# Patient Record
Sex: FEMALE | Race: WHITE | NOT HISPANIC OR LATINO | Employment: PART TIME | URBAN - METROPOLITAN AREA
[De-identification: names, ages, dates, MRNs, and addresses within clinical notes are randomized per-mention and may not be internally consistent; named-entity substitution may affect disease eponyms.]

---

## 2017-07-10 ENCOUNTER — TELEPHONE (OUTPATIENT)
Dept: HEALTH INFORMATION MANAGEMENT | Facility: OTHER | Age: 77
End: 2017-07-10

## 2017-07-10 ENCOUNTER — PATIENT OUTREACH (OUTPATIENT)
Dept: HEALTH INFORMATION MANAGEMENT | Facility: OTHER | Age: 77
End: 2017-07-10

## 2017-07-10 NOTE — TELEPHONE ENCOUNTER
Patient is over the recommended age and is showing overdue for Pap Smear, Mammogram, Colonoscopy and Bone Density in Health Maintenance.    Please reply to this message as to whether these tests are appropriate and I will update the Health Maintenance Topic or contact the patient to schedule.

## 2017-08-07 NOTE — PROGRESS NOTES
Attempt #:4    WebIZ Checked & Epic Updated: yes  HealthConnect Verified: yes  Verify PCP: yes    Communication Preference Obtained: yes     Review Care Team: yes    Annual Wellness Visit Scheduling  1. Scheduling Status:Scheduled          Care Gap Scheduling (Attempt to Schedule EACH Overdue Care Gap!)     Health Maintenance Due   Topic Date Due   • Annual Wellness Visit  SCHEDULED   • IMM DTaP/Tdap/Td Vaccine (1 - Tdap) WILL DISCUSS WITH PCP   • IMM ZOSTER VACCINE  WILL DISCUSS WITH PCP   • IMM PNEUMOCOCCAL 65+ (ADULT) LOW/MEDIUM RISK SERIES (1 of 2 - PCV13) WILL DISCUSS WITH PCP         MyChart Activation: sent activation code  C3 Metrics Mike: no  Virtual Visits: no  Opt In to Text Messages: no

## 2017-11-06 ENCOUNTER — TELEPHONE (OUTPATIENT)
Dept: MEDICAL GROUP | Facility: LAB | Age: 77
End: 2017-11-06

## 2017-11-06 NOTE — TELEPHONE ENCOUNTER
ANNUAL WELLNESS VISIT PRE-VISIT PLANNING     1.  Reviewed note from last office visit with PCP: YES    2.  If any orders were placed at last visit, do we have Results/Consult Notes?        •  Labs - Labs ordered, completed on 12/08/2016 and results are in chart.   Note: If patient appointment is for lab review and patient did not complete labs, check with provider if OK to reschedule patient until labs completed.       •  Imaging - Imaging was not ordered at last office visit.       •  Referrals - No referrals were ordered at last office visit.    3.  Immunizations were updated in [facility] using WebIZ?: Yes       •  WebIZ Recommendations: FLU, PREVNAR (PCV13) , TDAP and ZOSTAVAX (Shingles)       •  Is patient due for Tdap? YES. Patient was notified of copay/out of pocket cost.       •  Is patient due for Shingles? YES. Patient was notified of copay/out of pocket cost.     4.  Patient is due for the following Health Maintenance Topics:   Health Maintenance Due   Topic Date Due   • Annual Wellness Visit  1940   • IMM DTaP/Tdap/Td Vaccine (1 - Tdap) 07/22/1960   • IMM ZOSTER VACCINE  07/22/2001   • IMM PNEUMOCOCCAL 65+ (ADULT) LOW/MEDIUM RISK SERIES (1 of 2 - PCV13) 07/22/2006   • IMM INFLUENZA (1) 09/01/2017           5.  Reviewed/Updated the following with patient:       •   Preferred Pharmacy? YES       •   Preferred Lab? YES       •   Preferred Communication? YES       •   Allergies? YES       •   Medications? YES. Was Abstract Encounter opened and chart updated? YES       •   Social History? YES. Was Abstract Encounter opened and chart updated? YES       •   Family History (document living status of immediate family members and if + hx of cancer, diabetes, hypertension, hyperlipidemia, heart attack, stroke) YES. Was Abstract Encounter opened and chart updated? YES    6.  Care Team Updated:       •   DME Company (gait device, O2, CPAP, etc.): N\A       •   Other Specialists (eye doctor, derm, GYN, cardiology,  endo, etc): N\A    7.  Patient has the following Care Path diagnoses on Problem List:  NONE    8.  Specialty Comments was updated with diagnosis information provided by SCP: NO    9.  Patient was advised: “This is a free wellness visit. The provider will screen for medical conditions to help you stay healthy. If you have other concerns to address you may be asked to discuss these at a separate visit or there may be an additional fee.”     6.  Patient was informed to arrive 15 min prior to their scheduled appointment and bring in their medication bottles.

## 2017-11-10 ENCOUNTER — OFFICE VISIT (OUTPATIENT)
Dept: MEDICAL GROUP | Facility: LAB | Age: 77
End: 2017-11-10
Payer: MEDICARE

## 2017-11-10 VITALS
WEIGHT: 137 LBS | BODY MASS INDEX: 25.86 KG/M2 | HEIGHT: 61 IN | HEART RATE: 65 BPM | SYSTOLIC BLOOD PRESSURE: 106 MMHG | TEMPERATURE: 97.9 F | OXYGEN SATURATION: 98 % | DIASTOLIC BLOOD PRESSURE: 68 MMHG

## 2017-11-10 DIAGNOSIS — E78.5 DYSLIPIDEMIA: ICD-10-CM

## 2017-11-10 DIAGNOSIS — Z00.00 PREVENTATIVE HEALTH CARE: Primary | ICD-10-CM

## 2017-11-10 DIAGNOSIS — Z12.11 COLON CANCER SCREENING: ICD-10-CM

## 2017-11-10 DIAGNOSIS — R31.29 MICROHEMATURIA: ICD-10-CM

## 2017-11-10 PROCEDURE — G0439 PPPS, SUBSEQ VISIT: HCPCS | Performed by: PHYSICIAN ASSISTANT

## 2017-11-10 ASSESSMENT — PATIENT HEALTH QUESTIONNAIRE - PHQ9
SUM OF ALL RESPONSES TO PHQ QUESTIONS 1-9: 0
CLINICAL INTERPRETATION OF PHQ2 SCORE: 0

## 2017-11-10 NOTE — ASSESSMENT & PLAN NOTE
Not taking any medications.   Diet: Stated is very healthy, lots of vegetables. Does eat some meat but mostly chicken  Exercise: 3 days weekly mostly walking, plus she is very active and walks a lot for her job  No tobacco use  Etoh: About once every 6 months  Denies chest pain, shortness of breath, lightheadedness, muscle cramping   Ref. Range 12/8/2016 07:20   Cholesterol,Tot Latest Ref Range: 100 - 199 mg/dL 230 (H)   Triglycerides Latest Ref Range: 0 - 149 mg/dL 170 (H)   HDL Latest Ref Range: >=40 mg/dL 54   LDL Latest Ref Range: <100 mg/dL 142 (H)

## 2017-11-11 NOTE — PROGRESS NOTES
Chief Complaint   Patient presents with   • Annual Wellness Visit         HPI:  Ngozi is a 77 y.o. here for Medicare Annual Wellness Visit    Dyslipidemia  Not taking any medications.   Diet: Stated is very healthy, lots of vegetables. Does eat some meat but mostly chicken  Exercise: 3 days weekly mostly walking, plus she is very active and walks a lot for her job  No tobacco use  Etoh: About once every 6 months  Denies chest pain, shortness of breath, lightheadedness, muscle cramping   Ref. Range 12/8/2016 07:20   Cholesterol,Tot Latest Ref Range: 100 - 199 mg/dL 230 (H)   Triglycerides Latest Ref Range: 0 - 149 mg/dL 170 (H)   HDL Latest Ref Range: >=40 mg/dL 54   LDL Latest Ref Range: <100 mg/dL 142 (H)       Microhematuria  11/8/16 she had mod blood on UA, culture + Klebsiella pneumoniae which was treated appropriately but never had f/u UA.   Denies gross hematuria, abdominal discomfort, flank pain, dysuria, frequency of urination, urgency of urination, hesitancy of urination, fever or chills      Patient Active Problem List    Diagnosis Date Noted   • Dyslipidemia 11/10/2017   • Microhematuria 11/10/2017   • Preventative health care 12/06/2016       No current outpatient prescriptions on file.     No current facility-administered medications for this visit.         Not taking medications  Current supplements as per medication list.     Allergies: Aspirin; Codeine; and Declomycin [demeclocycline hcl]    Current social contact/activities: shopping, reading, decorating, dancing     Is patient current with immunizations? No, due for FLU, PREVNAR (PCV13) , TDAP and ZOSTAVAX (Shingles). Patient is interested in receiving NONE today.    She  reports that she quit smoking about 13 months ago. Her smoking use included Cigarettes. She has a 3.75 pack-year smoking history. She has never used smokeless tobacco. She reports that she drinks alcohol. She reports that she does not use drugs.  Counseling given:  Yes        DPA/Advanced directive: Patient does not have an Advanced Directive.  A packet and workshop information was given on Advanced Directives.    ROS:    Gait: Uses no assistive device   Ostomy: no   Other tubes: no   Amputations: no   Chronic oxygen use no   Last eye exam seven or eight years ago   Wears hearing aids: no   : Denies incontinence.     Depression Screening    Little interest or pleasure in doing things?  0 - not at all  Feeling down, depressed, or hopeless? 0 - not at all  Patient Health Questionnaire Score: 0    If depressive symptoms identified deferred to follow up visit unless specifically addressed in assessment and plan.    Interpretation of PHQ-9 Total Score   Score Severity   1-4 No Depression   5-9 Mild Depression   10-14 Moderate Depression   15-19 Moderately Severe Depression   20-27 Severe Depression    Screening for Cognitive Impairment    Three Minute Recall (apple, watch, pricila)  2/3 Apple,pricila, table  Draw clock face with all 12 numbers set to the hand to show 10 minutes past 11 o'clock   5/5  If cognitive concerns identified, deferred for follow up unless specifically addressed in assessment and plan.    Fall Risk Assessment    Has the patient had two or more falls in the last year or any fall with injury in the last year?  No  If fall risk identified, deferred for follow up unless specifically addressed in assessment and plan.    Safety Assessment    Throw rugs on floor.  Yes  Handrails on all stairs.  Yes  Good lighting in all hallways.  Yes  Difficulty hearing.  No  Patient counseled about all safety risks that were identified.    Functional Assessment ADLs    Are there any barriers preventing you from cooking for yourself or meeting nutritional needs?  No.    Are there any barriers preventing you from driving safely or obtaining transportation?  No.    Are there any barriers preventing you from using a telephone or calling for help?  No.    Are there any barriers  "preventing you from shopping?  No.    Are there any barriers preventing you from taking care of your own finances?  No.    Are there any barriers preventing you from managing your medications?  No. Not taking any medications.  Are you currently engaging any exercise or physical activity?  Yes.  Walking every day.    Health Maintenance Summary                Annual Wellness Visit Overdue 1940     IMM DTaP/Tdap/Td Vaccine Overdue 7/22/1960     IMM ZOSTER VACCINE Overdue 7/22/2001     IMM PNEUMOCOCCAL 65+ (ADULT) LOW/MEDIUM RISK SERIES Overdue 7/22/2006     IMM INFLUENZA Overdue 9/1/2017           Patient Care Team:  Marcie Damon P.A.-C. as PCP - General (Family Medicine)    Social History   Substance Use Topics   • Smoking status: Former Smoker     Packs/day: 0.25     Years: 15.00     Types: Cigarettes     Quit date: 10/1/2016   • Smokeless tobacco: Never Used      Comment: every other day for around 25 years.    • Alcohol use Yes      Comment: wine with italin dinner. 1 time every 6 mo.      Family History   Problem Relation Age of Onset   • Cancer Sister      breast, half sister   • Heart Disease Neg Hx    • Diabetes Neg Hx    • Stroke Neg Hx      She  has a past medical history of Personal history of venous thrombosis and embolism. She also has no past medical history of Breast cancer (CMS-HCC).   Past Surgical History:   Procedure Laterality Date   • COLPOSACROPEXY ROBOTIC  2/1/2012    Performed by KIP TORO at [facility]   • CYSTOSCOPY  2/1/2012    Performed by KIP TORO at [facility]   • ABDOMINAL HYSTERECTOMY TOTAL  age 30    simple hysterectomy           Exam:     Blood pressure 106/68, pulse 65, temperature 36.6 °C (97.9 °F), height 1.549 m (5' 1\"), weight 62.1 kg (137 lb), SpO2 98 %. Body mass index is 25.89 kg/m².    Hearing excellent.    Dentition good  Alert, oriented in no acute distress.  Eye contact is good, speech goal directed, affect calm  HEENT: " conjunctiva non-injected, sclera non-icteric, PERRL.  Pinna normal. TM pearly gray.   Oral mucous membranes pink and moist with no lesions.  Neck No adenopathy or masses in the neck or supraclavicular regions.  Lungs: clear to auscultation bilaterally with good excursion.  CV: regular rate and rhythm.  Abdomen: soft, nontender, no HSM, No CVAT  Ext: no edema, color normal, peripheral pulses 2+, temperature normal    Assessment and Plan. The following treatment and monitoring plan is recommended:    1. Preventative health care     mammo- 10/24/13- normal, denies any further  Pap- hysterectomy age 29yo.   Colonoscopy- never. Declines  FIT-ordered today  Flu- declines   Shingles- declines  Pneumonia- declines.  Annual Wellness Visit - Includes PPPS Subsequent ()   2. Dyslipidemia  COMP METABOLIC PANEL   Uncontrolled, labs ordered today, will call with results   Discussed in depth dietary changes can be made for healthy lifestyle  LIPID PROFILE    Annual Wellness Visit - Includes PPPS Subsequent ()   3. Microhematuria   Had an infection, most likely is just from the infection though we are following up  Annual Wellness Visit - Includes PPPS Subsequent ()    URINALYSIS,CULTURE IF INDICATED    CANCELED: URINALYSIS,CULTURE IF INDICATED   4. Colon cancer screening  OCCULT BLOOD FECES IMMUNOASSAY   Declined colonoscopy   Ordered  Fit test today Annual Wellness Visit - Includes PPPS Subsequent ()         Services suggested: No services needed at this time  Health Care Screening recommendations as per orders if indicated.  Referrals offered: PT/OT/Nutrition counseling/Behavioral Health/Smoking cessation as per orders if indicated.    Discussion today about general wellness and lifestyle habits:    · Prevent falls and reduce trip hazards; Cautioned about securing or removing rugs.  · Have a working fire alarm and carbon monoxide detector;   · Engage in regular physical activity and social activities        Follow-up: Return in about 6 months (around 5/10/2018) for chronic conditions.

## 2017-12-13 ENCOUNTER — HOSPITAL ENCOUNTER (OUTPATIENT)
Facility: MEDICAL CENTER | Age: 77
End: 2017-12-13
Attending: PHYSICIAN ASSISTANT
Payer: MEDICARE

## 2017-12-13 PROCEDURE — 82274 ASSAY TEST FOR BLOOD FECAL: CPT

## 2017-12-17 DIAGNOSIS — Z12.11 COLON CANCER SCREENING: ICD-10-CM

## 2017-12-18 LAB — HEMOCCULT STL QL IA: NEGATIVE

## 2017-12-19 ENCOUNTER — TELEPHONE (OUTPATIENT)
Dept: MEDICAL GROUP | Facility: LAB | Age: 77
End: 2017-12-19

## 2017-12-19 NOTE — TELEPHONE ENCOUNTER
----- Message from Marcie Damon P.A.-C. sent at 12/18/2017  5:37 PM PST -----  Please inform patient that her FIT test was negative for blood in her stool  Recommend repeat in one year  Thank you  Marcie

## 2018-04-27 ENCOUNTER — PATIENT OUTREACH (OUTPATIENT)
Dept: HEALTH INFORMATION MANAGEMENT | Facility: OTHER | Age: 78
End: 2018-04-27

## 2018-04-27 NOTE — PROGRESS NOTES
Outcome: Left Message    Please transfer to Patient Outreach Team at [phone] when patient returns call.        Attempt # 1

## 2018-05-10 ENCOUNTER — APPOINTMENT (OUTPATIENT)
Dept: MEDICAL GROUP | Facility: LAB | Age: 78
End: 2018-05-10
Payer: MEDICARE

## 2018-07-09 ENCOUNTER — APPOINTMENT (OUTPATIENT)
Dept: MEDICAL GROUP | Facility: MEDICAL CENTER | Age: 78
End: 2018-07-09
Payer: MEDICARE

## 2018-08-23 ENCOUNTER — OFFICE VISIT (OUTPATIENT)
Dept: MEDICAL GROUP | Facility: MEDICAL CENTER | Age: 78
End: 2018-08-23
Payer: MEDICARE

## 2018-08-23 VITALS
HEART RATE: 58 BPM | BODY MASS INDEX: 25.06 KG/M2 | TEMPERATURE: 97.9 F | HEIGHT: 61 IN | SYSTOLIC BLOOD PRESSURE: 130 MMHG | OXYGEN SATURATION: 99 % | WEIGHT: 132.72 LBS | DIASTOLIC BLOOD PRESSURE: 68 MMHG

## 2018-08-23 DIAGNOSIS — Z00.00 MEDICARE ANNUAL WELLNESS VISIT, SUBSEQUENT: ICD-10-CM

## 2018-08-23 DIAGNOSIS — E78.5 DYSLIPIDEMIA: ICD-10-CM

## 2018-08-23 DIAGNOSIS — Z00.00 PREVENTATIVE HEALTH CARE: ICD-10-CM

## 2018-08-23 PROBLEM — R31.29 MICROHEMATURIA: Status: RESOLVED | Noted: 2017-11-10 | Resolved: 2018-08-23

## 2018-08-23 PROCEDURE — G0439 PPPS, SUBSEQ VISIT: HCPCS | Performed by: INTERNAL MEDICINE

## 2018-08-23 ASSESSMENT — ENCOUNTER SYMPTOMS: GENERAL WELL-BEING: EXCELLENT

## 2018-08-23 ASSESSMENT — PATIENT HEALTH QUESTIONNAIRE - PHQ9: CLINICAL INTERPRETATION OF PHQ2 SCORE: 0

## 2018-08-23 ASSESSMENT — ACTIVITIES OF DAILY LIVING (ADL): BATHING_REQUIRES_ASSISTANCE: 0

## 2018-08-24 NOTE — PROGRESS NOTES
CC:   had concerns including Establish Care and Other (question about prolapse). and Health Risk Assessment Exam    HPI:  Ngozi is a 78 y.o. here for Health Risk Assessment and Establish Care and Other (question about prolapse)    PCP: Darrel Ballard M.D.  Other Care Team Members: PCP  Patient Care Team:  Darrel Ballard M.D. as PCP - General (Family Medicine)    Patient Active Problem List    Diagnosis Date Noted   • Dyslipidemia 11/10/2017   • Preventative health care 12/06/2016     No current outpatient prescriptions on file.     No current facility-administered medications for this visit.       Current supplements: as above.  Chronic narcotic pain medicines: no  Allergies: Aspirin; Codeine; and Declomycin [demeclocycline hcl]    Screening:  Depression Screening  Little interest or pleasure in doing things?  0 - not at all  Feeling down, depressed , or hopeless? 0 - not at all  Patient Health Questionnaire Score: 0     If depressive symptoms identified deferred to follow up visit unless specifically addressed in assessment and plan.    Interpretation of PHQ-9 Total Score   Score Severity   1-4 No Depression   5-9 Mild Depression   10-14 Moderate Depression   15-19 Moderately Severe Depression   20-27 Severe Depression    Screening for Cognitive Impairment    Three Minute Recall (leader, season, table) 3/3    Demetrius clock face with all 12 numbers and set the hands to show 10 past 11.  Yes    Cognitive concerns identified deferred for follow up unless specifically addressed in assessment and plan.    Fall Risk Assessment    Has the patient had two or more falls in the last year or any fall with injury in the last year?  No    Safety Assessment    Throw rugs on floor.  Yes  Handrails on all stairs.  Yes  Good lighting in all hallways.  Yes  Difficulty hearing.  Yes  Patient counseled about all safety risks that were identified.    Functional Assessment ADLs    Are there any barriers preventing you from  cooking for yourself or meeting nutritional needs?  No.    Are there any barriers preventing you from driving safely or obtaining transportation?  No.    Are there any barriers preventing you from using a telephone or calling for help?  No.    Are there any barriers preventing you from shopping?  No.    Are there any barriers preventing you from taking care of your own finances?  No.    Are there any barriers preventing you from managing your medications?  No.    Are there any barriers preventing you from showering, bathing or dressing yourself?  No.    Are you currently engaging in any exercise or physical activity?  Yes.     What is your perception of your health?  Excellent.      Health Maintenance Summary                IMM DTaP/Tdap/Td Vaccine Overdue 7/22/1960     IMM ZOSTER VACCINES Overdue 7/22/1991     IMM PNEUMOCOCCAL 65+ (ADULT) LOW/MEDIUM RISK SERIES Overdue 7/22/2006     IMM INFLUENZA Next Due 9/1/2018     Annual Wellness Visit Next Due 8/24/2019      Done 8/23/2018           Patient Care Team:  Darrel Ballard M.D. as PCP - General (Family Medicine)      Allergies: Aspirin; Codeine; and Declomycin [demeclocycline hcl]  No current Epic-ordered outpatient prescriptions on file.     No current [facility]-ordered facility-administered medications on file.      Past Medical History:   Diagnosis Date   • Personal history of venous thrombosis and embolism     at age 26, from oral contaceptives     Past Surgical History:   Procedure Laterality Date   • COLPOSACROPEXY ROBOTIC  2/1/2012    Performed by KPI TORO at SUR[facility]ERY [facility]   • CYSTOSCOPY  2/1/2012    Performed by KIP TORO at SUR[facility]   • ABDOMINAL HYSTERECTOMY TOTAL  age 30    simple hysterectomy     Social History   Substance Use Topics   • Smoking status: Former Smoker     Packs/day: 0.25     Years: 15.00     Types: Cigarettes     Quit date: 10/1/2016   • Smokeless tobacco: Never Used      Comment: every other  "day for around 25 years.    • Alcohol use Yes      Comment: wine with italin dinner. 1 time every 6 mo.      Family History   Problem Relation Age of Onset   • Cancer Sister         breast, half sister   • Heart Disease Neg Hx    • Diabetes Neg Hx    • Stroke Neg Hx        ROS:    Ostomy or other tubes or amputations: no  Chronic oxygen use no  : Denies incontinence.  Gait: Uses no assistive device   Hearing good.    Dentition good  Constitutional: Negative for fever, chills/sweats   Respiratory: Negative for shortness of breath, LOERA  Cardiovascular: Negative for chest pain or pressure  GI/: Negative for diarrhea/constipation / urinary difficulty  All other systems reviewed and are negative.     Exam: Blood pressure 130/68, pulse (!) 58, temperature 36.6 °C (97.9 °F), height 1.549 m (5' 0.98\"), weight 60.2 kg (132 lb 11.5 oz), SpO2 99 %. Body mass index is 25.09 kg/m².  Alert, oriented in no acute distress.  Eye contact is good, speech goal directed, affect calm  HEENT: conjunctiva non-injected, sclera non-icteric.  Pinna normal. No concerning lesions.   Oral mucous membranes pink and moist with no lesions.  Neck supple with no cervical lymphadenopathy  Lungs: clear to auscultation bilaterally with good excursion.  CV: regular rate and rhythm.  Abdomen No CVAT  Ext: no edema.     Labs  Reviewed and discussed:  Lab Results   Component Value Date/Time    CHOLSTRLTOT 230 (H) 12/08/2016 07:20 AM     (H) 12/08/2016 07:20 AM    HDL 54 12/08/2016 07:20 AM    TRIGLYCERIDE 170 (H) 12/08/2016 07:20 AM       Lab Results   Component Value Date/Time    SODIUM 141 12/08/2016 07:20 AM    POTASSIUM 3.8 12/08/2016 07:20 AM    CHLORIDE 108 12/08/2016 07:20 AM    CO2 27 12/08/2016 07:20 AM    GLUCOSE 87 12/08/2016 07:20 AM    BUN 13 12/08/2016 07:20 AM    CREATININE 0.87 12/08/2016 07:20 AM     Lab Results   Component Value Date/Time    ALKPHOSPHAT 79 10/24/2013 07:57 AM    ASTSGOT 13 10/24/2013 07:57 AM    ALTSGPT 10 " 10/24/2013 07:57 AM    TBILIRUBIN 0.5 10/24/2013 07:57 AM     Lab Results   Component Value Date/Time    WBC 10.4 02/02/2012 05:46 AM    RBC 3.15 (L) 02/02/2012 05:46 AM    HEMOGLOBIN 10.8 (L) 02/02/2012 05:46 AM    HEMATOCRIT 31.3 (L) 02/02/2012 05:46 AM    MCV 99.1 (H) 02/02/2012 05:46 AM    MCH 34.3 (H) 02/02/2012 05:46 AM    MCHC 34.6 02/02/2012 05:46 AM    MPV 7.3 02/02/2012 05:46 AM    NEUTSPOLYS 63.3 01/09/2012 11:18 AM    LYMPHOCYTES 26.8 01/09/2012 11:18 AM    MONOCYTES 7.1 01/09/2012 11:18 AM    EOSINOPHILS 2.1 01/09/2012 11:18 AM    BASOPHILS 0.7 01/09/2012 11:18 AM      Assessment and Plan    1. Medicare annual wellness visit, subsequent  - Annual Wellness Visit - Includes PPPS Subsequent ()    2. Preventative health care  Advised immunizations x 4.  - Annual Wellness Visit - Includes PPPS Subsequent ()    3. Dyslipidemia  The patient prefers lifestyle modification: She will continue healthy diet, daily exercise  - Annual Wellness Visit - Includes PPPS Subsequent ()    Health Care Screening recommendations reviewed with patient today:    - Depression: no issues   - ADL/IADL: independent   - Feeding/nutrition: advised healthy diet.    - Hearing: Good no issues    Referrals for PT/OT/Nutrition counseling/Behavioral Health/Smoking cessation: none  Discussion today about general wellness and lifestyle habits:    · Prevent falls and reduce trip hazards;   · Have a working fire alarm and carbon monoxide detector;   · Engage in regular physical activity and social activities.    Next office visit is due in  1 year

## 2018-10-15 ENCOUNTER — PATIENT OUTREACH (OUTPATIENT)
Dept: HEALTH INFORMATION MANAGEMENT | Facility: OTHER | Age: 78
End: 2018-10-15

## 2018-10-15 NOTE — PROGRESS NOTES
Outcome: Left Message    Please transfer to Patient Outreach Team at [phone] when patient returns call.    WebIZ Checked & Epic Updated:  yes    HealthConnect Verified: yes    Attempt # 1

## 2018-11-02 NOTE — PROGRESS NOTES
Outcome: Left Message    Please transfer to Patient Outreach Team at [phone] when patient returns call.    WebIZ Checked & Epic Updated:  yes    HealthConnect Verified: yes    Attempt # 2

## 2018-11-07 NOTE — PROGRESS NOTES
Outcome: Left Message    Please transfer to Patient Outreach Team at [phone] when patient returns call.    WebIZ Checked & Epic Updated:  yes    HealthConnect Verified: yes    Attempt # 3

## 2018-11-08 NOTE — PROGRESS NOTES
1. Attempt #:3    2. HealthConnect Verified: yes    3. Verify PCP: yes    4. Review Care Team: no    5. WebIZ Checked & Epic Updated: Yes  · WebIZ Recommendations: FLU, PNEUMOVAX (PPSV23), TDAP and SHINGRIX (Shingles)  · Is patient due for Tdap? YES. Patient was not notified of copay/out of pocket cost.  · Is patient due for Shingles? YES. Patient was not notified of copay/out of pocket cost.    6. Reviewed/Updated the following with patient:       •   Communication Preference Obtained? YES       •   Preferred Pharmacy? YES       •   Preferred Lab? YES       •   Family History (document living status of immediate family members and if + hx of cancer, diabetes, hypertension, hyperlipidemia, heart attack, stroke) NO    7. Annual Wellness Visit Scheduling  · Scheduling Status:Not Scheduled. Patient states they have already completed their AWV     8. Care Gap Scheduling (Attempt to Schedule EACH Overdue Care Gap!)     Health Maintenance Due   Topic Date Due   • IMM ZOSTER VACCINES (1 of 2) 07/22/1991        Patient declined Immunizations: FLU, PNEUMOVAX (PPSV23), TDAP and SHINGRIX (Shingles).     9. Whim Activation: declined    10. Whim Mike: no    11. Virtual Visits: no    12. Opt In to Text Messages: no    13. Patient was NOT advised: “This is a free wellness visit. The provider will screen for medical conditions to help you stay healthy. If you have other concerns to address you may be asked to discuss these at a separate visit or there may be an additional fee.”     14. Patient was NOT informed to arrive 15 min prior to their scheduled appointment and bring in their medication bottles.

## 2018-12-24 ENCOUNTER — HOSPITAL ENCOUNTER (OUTPATIENT)
Dept: LAB | Facility: MEDICAL CENTER | Age: 78
End: 2018-12-24
Attending: PHYSICIAN ASSISTANT
Payer: MEDICARE

## 2018-12-24 ENCOUNTER — OFFICE VISIT (OUTPATIENT)
Dept: URGENT CARE | Facility: PHYSICIAN GROUP | Age: 78
End: 2018-12-24
Payer: MEDICARE

## 2018-12-24 VITALS
HEART RATE: 58 BPM | WEIGHT: 134 LBS | BODY MASS INDEX: 25.3 KG/M2 | TEMPERATURE: 98.4 F | HEIGHT: 61 IN | RESPIRATION RATE: 16 BRPM | DIASTOLIC BLOOD PRESSURE: 76 MMHG | SYSTOLIC BLOOD PRESSURE: 122 MMHG | OXYGEN SATURATION: 96 %

## 2018-12-24 DIAGNOSIS — R51.9 LEFT-SIDED FACE PAIN: ICD-10-CM

## 2018-12-24 DIAGNOSIS — K08.89 PAIN, DENTAL: ICD-10-CM

## 2018-12-24 LAB — ERYTHROCYTE [SEDIMENTATION RATE] IN BLOOD BY WESTERGREN METHOD: 19 MM/HOUR (ref 0–30)

## 2018-12-24 PROCEDURE — 99204 OFFICE O/P NEW MOD 45 MIN: CPT | Performed by: PHYSICIAN ASSISTANT

## 2018-12-24 PROCEDURE — 85652 RBC SED RATE AUTOMATED: CPT

## 2018-12-24 PROCEDURE — 36415 COLL VENOUS BLD VENIPUNCTURE: CPT

## 2018-12-24 RX ORDER — AMOXICILLIN 500 MG/1
500 CAPSULE ORAL 3 TIMES DAILY
Qty: 21 CAP | Refills: 0 | Status: SHIPPED | OUTPATIENT
Start: 2018-12-24 | End: 2018-12-26

## 2018-12-24 RX ORDER — IBUPROFEN 600 MG/1
600 TABLET ORAL EVERY 6 HOURS PRN
Qty: 30 TAB | Refills: 0 | Status: SHIPPED | OUTPATIENT
Start: 2018-12-24 | End: 2018-12-26

## 2018-12-24 ASSESSMENT — ENCOUNTER SYMPTOMS
SHORTNESS OF BREATH: 0
EYE PAIN: 0
ABDOMINAL PAIN: 0
SENSORY CHANGE: 0
FOCAL WEAKNESS: 0
PHOTOPHOBIA: 0
CHILLS: 0
VOMITING: 0
NAUSEA: 0
HEADACHES: 0
FEVER: 0
DOUBLE VISION: 0
DIARRHEA: 0
EYE REDNESS: 0
SEIZURES: 0
WEAKNESS: 0
BLURRED VISION: 0
TINGLING: 0
EYE DISCHARGE: 0
CONSTIPATION: 0
TREMORS: 0
COUGH: 0
SPEECH CHANGE: 0
LOSS OF CONSCIOUSNESS: 0

## 2018-12-24 NOTE — PROGRESS NOTES
Subjective:   Ngozi Ledesma is a 78 y.o. female who presents for Pain (Jaw pain has been happening on off for 2 years but has increased the last two day.  Has seen the dentist and will be seeing her PCP on Wednesday.)        Pt has had L sided face pain x 2 years, intermittently. Pt was evaluated by Dr. Delgado her dentist, had XR as well evaluation endocologist. PT was told there is no dental abscess or cavity causing pain. Pt requesting tooth extraction at next visit. Pt was Amox at last visit 1 month ago with some improvement. Pt presents to clinic today with L maxilla  x 2 days. Pt taking tylenol for pain. Pain is constant and radiates to the L temporal area. She notes increased lacrimation of L eye. No blurred vision or change in vision.      Pt had thrombophlebitis 55 years ago with OCP. No hx of HTN, CAD, DM, PMR.       Review of Systems   Constitutional: Negative for chills, fever and malaise/fatigue.   HENT: Negative for ear pain.    Eyes: Negative for blurred vision, double vision, photophobia, pain, discharge and redness.   Respiratory: Negative for cough and shortness of breath.    Gastrointestinal: Negative for abdominal pain, constipation, diarrhea, nausea and vomiting.   Neurological: Negative for tingling, tremors, sensory change, speech change, focal weakness, seizures, loss of consciousness, weakness and headaches.   All other systems reviewed and are negative.      PMH:  has a past medical history of Personal history of venous thrombosis and embolism. She also has no past medical history of Breast cancer (HCC).  MEDS:   Current Outpatient Prescriptions:   •  amoxicillin (AMOXIL) 500 MG Cap, Take 1 Cap by mouth 3 times a day for 7 days., Disp: 21 Cap, Rfl: 0  •  ibuprofen (MOTRIN) 600 MG Tab, Take 1 Tab by mouth every 6 hours as needed for up to 5 days., Disp: 30 Tab, Rfl: 0  ALLERGIES:   Allergies   Allergen Reactions   • Aspirin Swelling   • Codeine Vomiting   • Declomycin [Demeclocycline Hcl]  "Rash           SURGHX:   Past Surgical History:   Procedure Laterality Date   • COLPOSACROPEXY ROBOTIC  2/1/2012    Performed by KIP TORO at SURGERY [facility] ORS   • CYSTOSCOPY  2/1/2012    Performed by KIP TORO at SURGERY [facility] ORS   • ABDOMINAL HYSTERECTOMY TOTAL  age 30    simple hysterectomy     SOCHX:  reports that she quit smoking about 2 years ago. Her smoking use included Cigarettes. She has a 3.75 pack-year smoking history. She has never used smokeless tobacco. She reports that she drinks alcohol. She reports that she does not use drugs.  Family History   Problem Relation Age of Onset   • Cancer Sister         breast, half sister   • Heart Disease Neg Hx    • Diabetes Neg Hx    • Stroke Neg Hx         Objective:   /76   Pulse (!) 58   Temp 36.9 °C (98.4 °F)   Resp 16   Ht 1.549 m (5' 1\")   Wt 60.8 kg (134 lb)   SpO2 96%   BMI 25.32 kg/m²     Physical Exam   Constitutional: She is oriented to person, place, and time. She appears well-developed and well-nourished. No distress.   HENT:   Head: Normocephalic and atraumatic.       Eyes: Pupils are equal, round, and reactive to light. Conjunctivae are normal.   Cardiovascular: Normal rate and regular rhythm.    Pulmonary/Chest: Effort normal and breath sounds normal.   Neurological: She is alert and oriented to person, place, and time.   Skin: Skin is warm and dry.   Psychiatric: She has a normal mood and affect. Her behavior is normal.   Vitals reviewed.    Sed Rate Westergren 0 - 30 mm/hour 19          Assessment/Plan:     1. Left-sided face pain  WESTERGREN SED RATE    ibuprofen (MOTRIN) 600 MG Tab   2. Pain, dental  amoxicillin (AMOXIL) 500 MG Cap    ibuprofen (MOTRIN) 600 MG Tab     Pt will be notified of ESR results. Patient was given a contingent antibiotic prescription to fill and use as directed if symptoms progressed as discussed and agreed upon. We discussed possibly due to dental pain, ddx also discussed " temporal arteritis, trigeminal neuralgia.    Follow-up with primary care provider within 7-10 days.  If symptoms worsen or persist patient can contact me or return to clinic for follow-up.  Red flags and emergency room precautions discussed.  Patient appears understanding of information.

## 2018-12-26 ENCOUNTER — OFFICE VISIT (OUTPATIENT)
Dept: MEDICAL GROUP | Facility: MEDICAL CENTER | Age: 78
End: 2018-12-26
Payer: MEDICARE

## 2018-12-26 VITALS
TEMPERATURE: 98.2 F | HEIGHT: 61 IN | WEIGHT: 134 LBS | DIASTOLIC BLOOD PRESSURE: 80 MMHG | SYSTOLIC BLOOD PRESSURE: 120 MMHG | BODY MASS INDEX: 25.3 KG/M2 | RESPIRATION RATE: 12 BRPM | HEART RATE: 74 BPM | OXYGEN SATURATION: 96 %

## 2018-12-26 DIAGNOSIS — G50.0 TRIGEMINAL NEURALGIA OF LEFT SIDE OF FACE: ICD-10-CM

## 2018-12-26 PROCEDURE — 99214 OFFICE O/P EST MOD 30 MIN: CPT | Performed by: FAMILY MEDICINE

## 2018-12-26 RX ORDER — GABAPENTIN 300 MG/1
300 CAPSULE ORAL
Qty: 30 CAP | Refills: 3 | Status: SHIPPED | OUTPATIENT
Start: 2018-12-26

## 2018-12-26 NOTE — PROGRESS NOTES
CC:The encounter diagnosis was Trigeminal neuralgia of left side of face.    HISTORY OF PRESENT ILLNESS: Patient is a 78 y.o. female established patient of Dr. Ribeiro, who presents today to complain that she has had pain ongoing for 3 years off and on over the left side of her face spread over the left cheek and left jaw.  She describes trigeminal neuralgia perfectly.  Hers has not been constant it does come and go.  Patient is having pain currently without relief despite taking the ibuprofen 600 mg that the urgent care provided her.    Health Maintenance: Completed    Trigeminal neuralgia of left side of face  This is a condition that started for her about 3 years ago.  This is the first time I am seeing her for the condition.  She goes where she will have episodes may be as long as 5-6 months where she has no pain and then the pain returns mainly over her cheeks and lower jaw on the left side of her face.  She is been checked by her dentist as well as an endodontist to make certain that there is no abnormalities within her teeth or her root canal.  It appears to me that she has trigeminal neuralgia.  We talked about trying a medication to see if we can calm this down.  She was seen in the urgent care and they did a sed rate which comes back normal at 19 so this is not giant cell arteritis.  Patient has been utilizing ibuprofen 600 mg every 6 hours without relief of her pain.  She initially tried Tylenol as well which did not help.      Patient Active Problem List    Diagnosis Date Noted   • Trigeminal neuralgia of left side of face 12/26/2018   • Dyslipidemia 11/10/2017   • Preventative health care 12/06/2016      Allergies:Aspirin; Codeine; and Declomycin [demeclocycline hcl]    Current Outpatient Prescriptions   Medication Sig Dispense Refill   • gabapentin (NEURONTIN) 300 MG Cap Take 1 Cap by mouth every bedtime. 30 Cap 3     No current facility-administered medications for this visit.        Social History  "  Substance Use Topics   • Smoking status: Former Smoker     Packs/day: 0.25     Years: 15.00     Types: Cigarettes     Quit date: 10/1/2016   • Smokeless tobacco: Never Used      Comment: every other day for around 25 years.    • Alcohol use Yes      Comment: wine with italin dinner. 1 time every 6 mo.      Social History     Social History Narrative   • No narrative on file       Family History   Problem Relation Age of Onset   • Cancer Sister         breast, half sister   • Heart Disease Neg Hx    • Diabetes Neg Hx    • Stroke Neg Hx         ROS:     - Constitutional:  Negative for fever, chills, unexpected weight change, and fatigue/generalized weakness.    - HEENT:  Negative for headaches, vision changes, hearing changes, ear pain, ear discharge, rhinorrhea, sinus congestion, sore throat, and neck pain.       - Neurological: Positive for trigeminal neuralgia on the left otherwise denies dizziness, tingling, tremors,  focal weakness and headaches.     - Endo/Heme/Allergies: Does not bruise/bleed easily.     - Psychiatric/Behavioral: Negative for depression, suicidal/homicidal ideation and memory loss.          - NOTE: All other systems reviewed and are negative, except as in HPI.      Exam:    Blood pressure 120/80, pulse 74, temperature 36.8 °C (98.2 °F), temperature source Temporal, resp. rate 12, height 1.549 m (5' 1\"), weight 60.8 kg (134 lb), SpO2 96 %. Body mass index is 25.32 kg/m².    General:  Well nourished, well developed female in NAD  Head is grossly normal.  Neck: Supple without JVD or bruit. Thyroid is not enlarged.  Pulmonary: Clear to ausculation and percussion.  Normal effort. No rales, ronchi, or wheezing.  Cardiovascular: Regular rate and rhythm without murmur. Carotid and radial pulses are intact and equal bilaterally.  Extremities: no clubbing, cyanosis, or edema.  Neuro: Cranial nerves II through XII grossly intact DTRs 2+/4 and equal bilaterally, sensation is intact and equal bilaterally, " gait is normal, coordination is normal  Please note that this dictation was created using voice recognition software. I have made every reasonable attempt to correct obvious errors, but I expect that there are errors of grammar and possibly content that I did not discover before finalizing the note.    Assessment/Plan:  1. Trigeminal neuralgia of left side of face  Uncontrolled, we will try her on gabapentin 300 mg nightly but I suspect she is going to need an injection.  I set her up as an urgent referral to Dr. Chavez.  - REFERRAL TO PHYSIATRY (PMR)

## 2018-12-26 NOTE — ASSESSMENT & PLAN NOTE
This is a condition that started for her about 3 years ago.  This is the first time I am seeing her for the condition.  She goes where she will have episodes may be as long as 5-6 months where she has no pain and then the pain returns mainly over her cheeks and lower jaw on the left side of her face.  She is been checked by her dentist as well as an endodontist to make certain that there is no abnormalities within her teeth or her root canal.  It appears to me that she has trigeminal neuralgia.  We talked about trying a medication to see if we can calm this down.  She was seen in the urgent care and they did a sed rate which comes back normal at 19 so this is not giant cell arteritis.  Patient has been utilizing ibuprofen 600 mg every 6 hours without relief of her pain.  She initially tried Tylenol as well which did not help.

## 2018-12-28 ENCOUNTER — OFFICE VISIT (OUTPATIENT)
Dept: URGENT CARE | Facility: PHYSICIAN GROUP | Age: 78
End: 2018-12-28
Payer: MEDICARE

## 2018-12-28 VITALS
SYSTOLIC BLOOD PRESSURE: 116 MMHG | HEART RATE: 72 BPM | BODY MASS INDEX: 22.33 KG/M2 | RESPIRATION RATE: 16 BRPM | WEIGHT: 134 LBS | OXYGEN SATURATION: 97 % | DIASTOLIC BLOOD PRESSURE: 64 MMHG | HEIGHT: 65 IN | TEMPERATURE: 98 F

## 2018-12-28 DIAGNOSIS — K12.2 ORAL INFECTION: ICD-10-CM

## 2018-12-28 PROCEDURE — 99214 OFFICE O/P EST MOD 30 MIN: CPT | Performed by: PHYSICIAN ASSISTANT

## 2018-12-28 RX ORDER — CLINDAMYCIN HYDROCHLORIDE 300 MG/1
300 CAPSULE ORAL 3 TIMES DAILY
Qty: 30 CAP | Refills: 0 | Status: SHIPPED | OUTPATIENT
Start: 2018-12-28 | End: 2019-01-07

## 2018-12-28 ASSESSMENT — ENCOUNTER SYMPTOMS
FEVER: 0
MYALGIAS: 0
ABDOMINAL PAIN: 0
DIARRHEA: 0
COUGH: 0
VOMITING: 0
NAUSEA: 0
SORE THROAT: 0
CHILLS: 0
SHORTNESS OF BREATH: 0
SPUTUM PRODUCTION: 0
DIZZINESS: 0

## 2019-01-03 ENCOUNTER — OFFICE VISIT (OUTPATIENT)
Dept: PHYSICAL MEDICINE AND REHAB | Facility: MEDICAL CENTER | Age: 79
End: 2019-01-03
Payer: MEDICARE

## 2019-01-03 VITALS
HEART RATE: 61 BPM | BODY MASS INDEX: 26.22 KG/M2 | DIASTOLIC BLOOD PRESSURE: 76 MMHG | TEMPERATURE: 98.2 F | SYSTOLIC BLOOD PRESSURE: 116 MMHG | HEIGHT: 61 IN | OXYGEN SATURATION: 97 % | WEIGHT: 138.89 LBS

## 2019-01-03 DIAGNOSIS — H91.92 HEARING LOSS OF LEFT EAR, UNSPECIFIED HEARING LOSS TYPE: ICD-10-CM

## 2019-01-03 DIAGNOSIS — K12.2 MOUTH ABSCESS: ICD-10-CM

## 2019-01-03 DIAGNOSIS — G50.0 TRIGEMINAL NEURALGIA OF LEFT SIDE OF FACE: ICD-10-CM

## 2019-01-03 PROCEDURE — 99204 OFFICE O/P NEW MOD 45 MIN: CPT | Performed by: PHYSICAL MEDICINE & REHABILITATION

## 2019-01-04 NOTE — PROGRESS NOTES
New patient note    Physiatry (physical medicine and  Rehabilitation), interventional spine and sports medicine    Date of Service: 1/3/2019    Chief complaint:   Chief Complaint   Patient presents with   • New Patient     Trigeminal neuralgia of left side of face        HISTORY    HPI: Ngozi Ledesma 78 y.o. female who presents today with Diagnoses of Trigeminal neuralgia of left side of face, Mouth abscess, and Hearing loss of left ear, unspecified hearing loss type were pertinent to this visit.    The patient has a recent diagnosis of trigeminal neuralgia on the left side of the face, she also has a history of a mouth abscess which was previously draining and responded very well to antibiotics.  The patient states that she has nearly completed her antibiotics and her left-sided face symptoms have drastically improved nearly resolved.  When she does have pain it is aching, nonradiating in the left side of the face and mouth and have been severe at times.  Now this is improved drastically.  She never had any burning type pain.  Denies any weakness of the extremities and face.  Denies dysarthria or swallowing difficulties.    ROS:   Red Flags ROS:   Fever, Chills, Sweats: Denies  Involuntary Weight Loss: Denies  Bladder Incontinence: Denies  Bowel Incontinence: denies  Saddle Anesthesia: Denies    All other systems reviewed and negative.       PMHx:   Past Medical History:   Diagnosis Date   • Personal history of venous thrombosis and embolism     at age 26, from oral contaceptives   • Trigeminal neuralgia of left side of face 12/26/2018       PSHx:   Past Surgical History:   Procedure Laterality Date   • COLPOSACROPEXY ROBOTIC  2/1/2012    Performed by KIP TORO at SURGERY [facility] ORS   • CYSTOSCOPY  2/1/2012    Performed by KIP TORO at SURGERY [facility] ORS   • ABDOMINAL HYSTERECTOMY TOTAL  age 30    simple hysterectomy       Family history   Family History   Problem Relation Age of Onset  "  • Cancer Sister         breast, half sister   • Heart Disease Neg Hx    • Diabetes Neg Hx    • Stroke Neg Hx          Medications: reviewed on epic.   Outpatient Prescriptions Marked as Taking for the 1/3/19 encounter (Office Visit) with Marco Chavez M.D.   Medication Sig Dispense Refill   • clindamycin (CLEOCIN) 300 MG Cap Take 1 Cap by mouth 3 times a day for 10 days. 30 Cap 0   • gabapentin (NEURONTIN) 300 MG Cap Take 1 Cap by mouth every bedtime. 30 Cap 3        Allergies:   Allergies   Allergen Reactions   • Aspirin Swelling   • Codeine Vomiting   • Declomycin [Demeclocycline Hcl] Rash             Social Hx:   Social History     Social History   • Marital status:      Spouse name: N/A   • Number of children: N/A   • Years of education: N/A     Occupational History   • Not on file.     Social History Main Topics   • Smoking status: Former Smoker     Packs/day: 0.25     Years: 15.00     Types: Cigarettes     Quit date: 10/1/2016   • Smokeless tobacco: Never Used      Comment: every other day for around 25 years.    • Alcohol use Yes      Comment: wine with italin dinner. 1 time every 6 mo.    • Drug use: No   • Sexual activity: Not Currently     Partners: Male      Comment: ,  at [facility]     Other Topics Concern   •  Service No   • Blood Transfusions Yes   • Caffeine Concern No   • Occupational Exposure No   • Hobby Hazards No   • Sleep Concern No   • Stress Concern No   • Weight Concern No   • Special Diet No   • Back Care No   • Exercise Yes   • Bike Helmet No   • Seat Belt Yes   • Self-Exams No     Social History Narrative   • No narrative on file         EXAMINATION     Physical Exam:   Vitals: Blood pressure 116/76, pulse 61, temperature 36.8 °C (98.2 °F), temperature source Temporal, height 1.549 m (5' 1\"), weight 63 kg (138 lb 14.2 oz), SpO2 97 %.    Constitutional:   Body Habitus: Body mass index is 26.24 kg/m².  Cooperation: Fully cooperates with " exam  Appearance: Well-groomed, well-nourished, not disheveled     Eyes: No scleral icterus to suggest severe liver disease, no proptosis to suggest severe hyperthyroid    ENT -no obvious auditory deficits, no obvious tongue lesions, tongue midline, no facial droop     Skin -no rashes or lesions noted     Respiratory-  breathing comfortable on room air, no audible wheezing    Cardiovascular- capillary refills less than 2 seconds. No lower extremity edema is noted.     Gastrointestinal - no obvious abdominal masses, No tenderness to palpation in the abdomen    Psychiatric- alert and oriented ×3. Normal affect.     Gait - normal gait, no use of ambulatory device, nonantalgic. the patient can toe walk with ease. the patient can heel walk with ease. the patient can tandem walk with ease. balance is appropriate..     Musculoskeletal -   Cervical spine   Inspection: No deformities of the skin over the cervical spine. No rashes or lesions.    full  A/P ROM in all directions      Spurling’s sign: negative bilaterally    No signs of muscular atrophy in bilateral upper extremities     No tenderness to palpation of the cervical facets      Neuro     CN  Cranial Nerves:     II - PERRL     III, IV, VI - EOMI     V - Equal sensation bilateral face. No TTP on the face.      VII - Face and smile symmetric     VIII - Hearing normal to finger rubbing on the right and decreased on the left     IX - Gag not tested     X - Uvula midline     XI - Shoulder shrugs equal     XII - Tongue protrusion midline, normal control       Key points for the international standards for neurological classification of spinal cord injury (ISNCSCI) to light touch.     Dermatome R L   C4 2 2   C5 2 2   C6 2 2   C7 2 2   C8 2 2   T1 2 2   T2 2 2   L2 2 2   L3 2 2   L4 2 2   L5 2 2   S1 2 2   S2 2 2           Motor Exam Upper Extremities   ? Myotome R L   Shoulder flexion C5 5 5   Elbow flexion C5 5 5   Wrist extension C6 5 5   Elbow extension C7 5 5   Finger  flexion C8 5 5   Finger abduction T1 5 5         Motor Exam Lower Extremities    ? Myotome R L   Hip flexion L2 5 5   Knee extension L3 5 5   Ankle dorsiflexion L4 5 5   Toe extension L5 5 5   Ankle plantarflexion S1 5 5       Pineda’s sign negative bilaterally   Babinski sign negative bilaterally   Clonus of the ankle negative bilaterally     Reflexes  ?  R L   Biceps  2+ 2+   Brachioradialis  2+ 2+   Patella  2+ 2+   Achilles   2+ 2+           MEDICAL DECISION MAKING    Medical records review: see under HPI section.     DATA    Labs:   Lab Results   Component Value Date/Time    SODIUM 141 12/08/2016 07:20 AM    POTASSIUM 3.8 12/08/2016 07:20 AM    CHLORIDE 108 12/08/2016 07:20 AM    CO2 27 12/08/2016 07:20 AM    ANION 6.0 12/08/2016 07:20 AM    GLUCOSE 87 12/08/2016 07:20 AM    BUN 13 12/08/2016 07:20 AM    CREATININE 0.87 12/08/2016 07:20 AM    CALCIUM 9.3 12/08/2016 07:20 AM    ASTSGOT 13 10/24/2013 07:57 AM    ALTSGPT 10 10/24/2013 07:57 AM    TBILIRUBIN 0.5 10/24/2013 07:57 AM    ALBUMIN 3.9 10/24/2013 07:57 AM    TOTPROTEIN 5.9 (L) 10/24/2013 07:57 AM    GLOBULIN 2.0 10/24/2013 07:57 AM    AGRATIO 2.0 10/24/2013 07:57 AM   ]    No results found for: PROTHROMBTM, INR     Lab Results   Component Value Date/Time    WBC 10.4 02/02/2012 05:46 AM    RBC 3.15 (L) 02/02/2012 05:46 AM    HEMOGLOBIN 10.8 (L) 02/02/2012 05:46 AM    HEMATOCRIT 31.3 (L) 02/02/2012 05:46 AM    MCV 99.1 (H) 02/02/2012 05:46 AM    MCH 34.3 (H) 02/02/2012 05:46 AM    MCHC 34.6 02/02/2012 05:46 AM    MPV 7.3 02/02/2012 05:46 AM    NEUTSPOLYS 63.3 01/09/2012 11:18 AM    LYMPHOCYTES 26.8 01/09/2012 11:18 AM    MONOCYTES 7.1 01/09/2012 11:18 AM    EOSINOPHILS 2.1 01/09/2012 11:18 AM    BASOPHILS 0.7 01/09/2012 11:18 AM        No results found for: HBA1C     Diagnosis   Visit Diagnoses     ICD-10-CM   1. Trigeminal neuralgia of left side of face G50.0   2. Mouth abscess K12.2   3. Hearing loss of left ear, unspecified hearing loss type H91.92            ASSESSMENT:  Ngozi Ledesma 78 y.o. female      Ngozi was seen today for new patient.    Diagnoses and all orders for this visit:    Trigeminal neuralgia of left side of face  Comments:  Unlikely that the patient has trigeminal neuralgia    Mouth abscess  Comments:  improved with antibiotics, continue gabapentin as needed.  Plan to titrate off    Hearing loss of left ear, unspecified hearing loss type  Comments:  Consider referral for hearing test for hearing loss in the left ear          Follow-up: PRN      Please note that this dictation was created using voice recognition software. I have made every reasonable attempt to correct obvious errors but there may be errors of grammar and content that I may have overlooked prior to finalization of this note.      Marco Chavez MD  Physical Medicine and Rehabilitation  Interventional Spine and Sports Physiatry  [facility]               Sola Gamino M.D.   ANAHI Ballard M.D.

## 2019-01-11 ENCOUNTER — TELEPHONE (OUTPATIENT)
Dept: MEDICAL GROUP | Facility: MEDICAL CENTER | Age: 79
End: 2019-01-11

## 2019-02-21 ENCOUNTER — TELEPHONE (OUTPATIENT)
Dept: MEDICAL GROUP | Facility: MEDICAL CENTER | Age: 79
End: 2019-02-21

## 2019-02-21 NOTE — TELEPHONE ENCOUNTER
VOICEMAIL  1. Caller Name: Ngozi Ledesma                        Call Back Number: [phone] (home)      2. Message: Patient called and left message for us to call her back because of her medication giving her side effects and she wanted an appointment. I have called patient and have made her an appoint with Dr. Ribeiro as Dr. Nelson will not be in the office.     3. Patient approves office to leave a detailed voicemail/MyChart message: N\A

## 2019-03-08 ENCOUNTER — OFFICE VISIT (OUTPATIENT)
Dept: MEDICAL GROUP | Facility: MEDICAL CENTER | Age: 79
End: 2019-03-08
Payer: MEDICARE

## 2019-03-08 VITALS
SYSTOLIC BLOOD PRESSURE: 122 MMHG | HEIGHT: 61 IN | BODY MASS INDEX: 25.97 KG/M2 | DIASTOLIC BLOOD PRESSURE: 74 MMHG | HEART RATE: 60 BPM | WEIGHT: 137.57 LBS | TEMPERATURE: 98.8 F | OXYGEN SATURATION: 97 %

## 2019-03-08 DIAGNOSIS — K12.2 ORAL ABSCESS: ICD-10-CM

## 2019-03-08 DIAGNOSIS — H61.21 IMPACTED CERUMEN, RIGHT EAR: ICD-10-CM

## 2019-03-08 DIAGNOSIS — H91.92 DECREASED HEARING, LEFT: ICD-10-CM

## 2019-03-08 DIAGNOSIS — G50.0 TRIGEMINAL NEURALGIA OF LEFT SIDE OF FACE: ICD-10-CM

## 2019-03-08 DIAGNOSIS — Z00.00 PREVENTATIVE HEALTH CARE: ICD-10-CM

## 2019-03-08 PROCEDURE — 99214 OFFICE O/P EST MOD 30 MIN: CPT | Performed by: INTERNAL MEDICINE

## 2019-03-08 RX ORDER — GABAPENTIN 100 MG/1
CAPSULE ORAL
Qty: 45 CAP | Refills: 1 | Status: SHIPPED | OUTPATIENT
Start: 2019-03-08

## 2019-03-08 NOTE — PROGRESS NOTES
CHIEF COMPLAINT  Chief Complaint   Patient presents with   • Medication Refill     HPI  Patient is a 78 y.o. female patient who presents today for the following     Trigeminal neuralgia, oral abscess, hearing loss  Onset: in mid 71, 2-3 yrs ago    Complains of intermittent severe pain on the left side of the jaw/face.  Follow-up visit revealed oral abscess, treated with antibiotic and subsequent dental evaluation/tooth extraction.  She was treated by gabapentin, resolved.  Denies current:  - mouth or face/jaw pain,   - fever, chills,   - numbness, tingling  - dizziness, lightheadedness  - balance problem.    She has been seen in the office, notes were reviewed:  1. 12/24/18,   · Ordered ESR, given ibuprofen and amoxicillin    2. 12/26/18, Dr. Stewart  · Diagnosed as trigeminal neuralgia, given gabapentin  3. 1/3/19, Dr Chavez:  · Diagnosed as mouth abscess, hearing loss on the left side; advised to continue gabapentin as needed and antibiotic    Symptoms resolved and she would like to know how to wean off from gabapentin.    Reviewed PMH, PSH, FH, SH, ALL, HCM/IMM, no changes  Reviewed MEDS, no changes    Patient Active Problem List    Diagnosis Date Noted   • Impacted cerumen, right ear 03/08/2019   • Trigeminal neuralgia of left side of face 12/26/2018   • Dyslipidemia 11/10/2017   • Preventative health care 12/06/2016     CURRENT MEDICATIONS  Current Outpatient Prescriptions   Medication Sig Dispense Refill   • gabapentin (NEURONTIN) 100 MG Cap Take 200 mg for 2 weeks, then 100 mg for 2 weeks, then stop 45 Cap 1   • gabapentin (NEURONTIN) 300 MG Cap Take 1 Cap by mouth every bedtime. 30 Cap 3     No current facility-administered medications for this visit.      ALLERGIES  Allergies: Aspirin; Codeine; and Declomycin [demeclocycline hcl]  PAST MEDICAL HISTORY  Past Medical History:   Diagnosis Date   • Trigeminal neuralgia of left side of face 12/26/2018   • Personal history of venous thrombosis and embolism   "   at age 26, from oral contaceptives     SURGICAL HISTORY  She  has a past surgical history that includes abdominal hysterectomy total (age 30); colposacropexy robotic (2012); and cystoscopy (2012).  SOCIAL HISTORY  Social History   Substance Use Topics   • Smoking status: Former Smoker     Packs/day: 0.25     Years: 15.00     Types: Cigarettes     Quit date: 10/1/2016   • Smokeless tobacco: Never Used      Comment: every other day for around 25 years.    • Alcohol use Yes      Comment: wine with italin dinner. 1 time every 6 mo.      Social History     Social History Narrative   • No narrative on file     FAMILY HISTORY  Family History   Problem Relation Age of Onset   • Cancer Sister         breast, half sister   • Heart Disease Neg Hx    • Diabetes Neg Hx    • Stroke Neg Hx      Family Status   Relation Status   • Sis Alive   • Mo    • Fa    • Bro    • Bro    • Sis Alive   • Sis Alive   • Son Alive   • Son Alive   • Paty Alive   • MGMo    • MGFa    • PGMo    • PGFa    • Neg Hx (Not Specified)     ROS   Constitutional: Negative for fever, chills.  HENT: As above.  Eyes: Negative for blurred vision.   Respiratory: Negative for cough, shortness of breath.  Cardiovascular: Negative for chest pain, palpitations.   Gastrointestinal: Negative for heartburn, abdominal pain.   Musculoskeletal: As above  Skin: Negative for rash.   Neuro: Negative for dizziness, weakness and headaches.   Endo/Heme/Allergies: Does not bruise/bleed easily.   Psychiatric/Behavioral: Negative for depression.    PHYSICAL EXAM   Blood pressure 122/74, pulse 60, temperature 37.1 °C (98.8 °F), temperature source Temporal, height 1.549 m (5' 1\"), weight 62.4 kg (137 lb 9.1 oz), SpO2 97 %. Body mass index is 25.99 kg/m².  General:  NAD, well appearing  HEENT:   NC/AT, PERRLA, EOMI, TM clear on left, cerumen impaction on the right. Oropharyngeal mucosa is pink,  without lesions;  " no cervical / supraclavicular  lymphadenopathy, no thyromegaly.    Cardiovascular: RRR.   No m/r/g. No carotid bruits .      Lungs:   CTAB, no w/r/r, no respiratory distress.  Abdomen: Soft, NT/ND + BS.  Extremities:  2+ DP and radial pulses bilaterally.  No c/c/e.   Skin:  Warm, dry.  No erythema. No rash.   Neurologic: Alert & oriented x 3. CN II-XII grossly intact.  No focal deficits.  Psychiatric:  Affect normal, mood normal, judgment normal.    LABS     Labs are reviewed and discussed with a patient  Lab Results   Component Value Date/Time    CHOLSTRLTOT 230 (H) 12/08/2016 07:20 AM     (H) 12/08/2016 07:20 AM    HDL 54 12/08/2016 07:20 AM    TRIGLYCERIDE 170 (H) 12/08/2016 07:20 AM       Lab Results   Component Value Date/Time    SODIUM 141 12/08/2016 07:20 AM    POTASSIUM 3.8 12/08/2016 07:20 AM    CHLORIDE 108 12/08/2016 07:20 AM    CO2 27 12/08/2016 07:20 AM    GLUCOSE 87 12/08/2016 07:20 AM    BUN 13 12/08/2016 07:20 AM    CREATININE 0.87 12/08/2016 07:20 AM     Lab Results   Component Value Date/Time    ALKPHOSPHAT 79 10/24/2013 07:57 AM    ASTSGOT 13 10/24/2013 07:57 AM    ALTSGPT 10 10/24/2013 07:57 AM    TBILIRUBIN 0.5 10/24/2013 07:57 AM      Lab Results   Component Value Date/Time    WBC 10.4 02/02/2012 05:46 AM    RBC 3.15 (L) 02/02/2012 05:46 AM    HEMOGLOBIN 10.8 (L) 02/02/2012 05:46 AM    HEMATOCRIT 31.3 (L) 02/02/2012 05:46 AM    MCV 99.1 (H) 02/02/2012 05:46 AM    MCH 34.3 (H) 02/02/2012 05:46 AM    MCHC 34.6 02/02/2012 05:46 AM    MPV 7.3 02/02/2012 05:46 AM    NEUTSPOLYS 63.3 01/09/2012 11:18 AM    LYMPHOCYTES 26.8 01/09/2012 11:18 AM    MONOCYTES 7.1 01/09/2012 11:18 AM    EOSINOPHILS 2.1 01/09/2012 11:18 AM    BASOPHILS 0.7 01/09/2012 11:18 AM      IMAGING     None    ASSESMENT AND PLAN        1. Trigeminal neuralgia of left side of face  Supportive care, differential diagnoses, and indications for immediate follow-up discussed with patient.     Pathogenesis of diagnosis discussed  including typical length and natural progression.   Instructed to return to clinic or nearest emergency department for any change in condition, further concerns, or worsening of symptoms.    Patient states understanding of the plan of care and discharge instructions.      Given detailed advised how to wean off from gabapentin  - gabapentin (NEURONTIN) 100 MG Cap; Take 200 mg for 2 weeks, then 100 mg for 2 weeks, then stop  Dispense: 45 Cap; Refill: 1    2. Oral abscess  Resolved    3. Decreased hearing, left  Advised ENT follow-up    4. Impacted cerumen, right ear  Apply ear wax drops, and come back for ear lavage in 2-3 weeks for ear lavage    5. Preventative health care  Advised immunizations X4    Counseling:   - Smoking:  Nonsmoker    Followup: Return if symptoms worsen or fail to improve.    All questions are answered.    Please note that this dictation was created using voice recognition software, and that there might be errors of cinthya and possibly content.

## 2019-03-20 ENCOUNTER — TELEPHONE (OUTPATIENT)
Dept: MEDICAL GROUP | Facility: MEDICAL CENTER | Age: 79
End: 2019-03-20

## 2019-03-21 ENCOUNTER — APPOINTMENT (OUTPATIENT)
Dept: MEDICAL GROUP | Facility: MEDICAL CENTER | Age: 79
End: 2019-03-21
Payer: MEDICARE

## 2019-10-25 ENCOUNTER — PATIENT OUTREACH (OUTPATIENT)
Dept: HEALTH INFORMATION MANAGEMENT | Facility: OTHER | Age: 79
End: 2019-10-25

## 2019-10-25 NOTE — PROGRESS NOTES
October 2019 Welcome Call     Outcome: Left Message    Please transfer to Patient Outreach Team at [phone] when patient returns call.    [facility] Verified: yes    Attempt # 1

## 2019-10-29 NOTE — PROGRESS NOTES
Pt. Is on October welcome call list, but as of 06/01/2019 pt is apart of [facility] plan.    Attempt#2

## 2021-01-11 DIAGNOSIS — Z23 NEED FOR VACCINATION: ICD-10-CM

## 2024-02-15 NOTE — TELEPHONE ENCOUNTER
ESTABLISHED PATIENT PRE-VISIT PLANNING     Patient was NOT contacted to complete PVP.     Note: Patient will not be contacted if there is no indication to call.     1.  Reviewed notes from the last few office visits within the medical group: Yes    2.  If any orders were placed at last visit or intended to be done for this visit (i.e. 6 mos follow-up), do we have Results/Consult Notes?        •  Labs - Labs were not ordered at last office visit.   Note: If patient appointment is for lab review and patient did not complete labs, check with provider if OK to reschedule patient until labs completed.       •  Imaging - Imaging was not ordered at last office visit.       •  Referrals - No referrals were ordered at last office visit.    3. Is this appointment scheduled as a Hospital Follow-Up? No    4.  Immunizations were updated in [facility] using WebIZ?: Yes       •  Web Iz Recommendations: SHINGRIX (Shingles)    5.  Patient is due for the following Health Maintenance Topics:   Health Maintenance Due   Topic Date Due   • IMM ZOSTER VACCINES (1 of 2) 07/22/1991       6. Orders for overdue Health Maintenance topics pended in Pre-Charting? NO    7.  [facility]HA (MDX) form printed for Provider? YES    8.  Patient was NOT informed to arrive 15 min prior to their scheduled appointment and bring in their medication bottles.   LVM to schedule follow up on right knee MRI.